# Patient Record
Sex: FEMALE | Race: WHITE | NOT HISPANIC OR LATINO | Employment: FULL TIME
[De-identification: names, ages, dates, MRNs, and addresses within clinical notes are randomized per-mention and may not be internally consistent; named-entity substitution may affect disease eponyms.]

---

## 2018-07-06 ENCOUNTER — RESULTS ONLY (OUTPATIENT)
Dept: LAB | Age: 28
End: 2018-07-06

## 2018-07-06 ENCOUNTER — APPOINTMENT (OUTPATIENT)
Dept: LAB | Facility: OTHER | Age: 28
End: 2018-07-06
Attending: FAMILY MEDICINE
Payer: COMMERCIAL

## 2018-07-06 LAB
APPEARANCE CSF: CLEAR
COLOR CSF: COLORLESS
GLUCOSE CSF-MCNC: 77 MG/DL (ref 40–80)
LYMPH ABN NFR CSF MANUAL: 94 %
MONOS+MACROS NFR CSF MANUAL: 6 %
PROT CSF-MCNC: 78 MG/DL (ref 15–45)
RBC # CSF MANUAL: 7 /UL (ref 0–2)
TUBE # CSF: 3 #
WBC # CSF MANUAL: 494 /UL (ref 0–5)

## 2019-09-26 ENCOUNTER — OFFICE VISIT (OUTPATIENT)
Dept: URGENT CARE | Facility: URGENT CARE | Age: 29
End: 2019-09-26
Payer: COMMERCIAL

## 2019-09-26 VITALS
DIASTOLIC BLOOD PRESSURE: 76 MMHG | OXYGEN SATURATION: 98 % | WEIGHT: 193.2 LBS | SYSTOLIC BLOOD PRESSURE: 110 MMHG | HEART RATE: 84 BPM | TEMPERATURE: 98.3 F

## 2019-09-26 DIAGNOSIS — R51.9 ACUTE INTRACTABLE HEADACHE, UNSPECIFIED HEADACHE TYPE: Primary | ICD-10-CM

## 2019-09-26 DIAGNOSIS — M26.609 TMJ (TEMPOROMANDIBULAR JOINT SYNDROME): ICD-10-CM

## 2019-09-26 PROBLEM — M79.10 MYALGIA: Status: ACTIVE | Noted: 2018-07-06

## 2019-09-26 PROBLEM — B00.3 MENINGITIS DUE TO HERPES SIMPLEX VIRUS: Status: ACTIVE | Noted: 2018-07-08

## 2019-09-26 LAB
ALBUMIN SERPL-MCNC: 4.1 G/DL (ref 3.4–5)
ALP SERPL-CCNC: 62 U/L (ref 40–150)
ALT SERPL W P-5'-P-CCNC: 18 U/L (ref 0–50)
ANION GAP SERPL CALCULATED.3IONS-SCNC: 8 MMOL/L (ref 3–14)
AST SERPL W P-5'-P-CCNC: 12 U/L (ref 0–45)
BASOPHILS # BLD AUTO: 0 10E9/L (ref 0–0.2)
BASOPHILS NFR BLD AUTO: 0.3 %
BILIRUB SERPL-MCNC: 0.3 MG/DL (ref 0.2–1.3)
BUN SERPL-MCNC: 13 MG/DL (ref 7–30)
CALCIUM SERPL-MCNC: 9.1 MG/DL (ref 8.5–10.1)
CHLORIDE SERPL-SCNC: 106 MMOL/L (ref 94–109)
CO2 SERPL-SCNC: 28 MMOL/L (ref 20–32)
CREAT SERPL-MCNC: 0.73 MG/DL (ref 0.52–1.04)
DIFFERENTIAL METHOD BLD: NORMAL
EOSINOPHIL # BLD AUTO: 0.1 10E9/L (ref 0–0.7)
EOSINOPHIL NFR BLD AUTO: 0.8 %
ERYTHROCYTE [DISTWIDTH] IN BLOOD BY AUTOMATED COUNT: 12.6 % (ref 10–15)
GFR SERPL CREATININE-BSD FRML MDRD: >90 ML/MIN/{1.73_M2}
GLUCOSE SERPL-MCNC: 84 MG/DL (ref 70–99)
HCT VFR BLD AUTO: 44.3 % (ref 35–47)
HGB BLD-MCNC: 14.4 G/DL (ref 11.7–15.7)
LYMPHOCYTES # BLD AUTO: 1.8 10E9/L (ref 0.8–5.3)
LYMPHOCYTES NFR BLD AUTO: 25.7 %
MCH RBC QN AUTO: 32.1 PG (ref 26.5–33)
MCHC RBC AUTO-ENTMCNC: 32.5 G/DL (ref 31.5–36.5)
MCV RBC AUTO: 99 FL (ref 78–100)
MONOCYTES # BLD AUTO: 0.6 10E9/L (ref 0–1.3)
MONOCYTES NFR BLD AUTO: 8.6 %
NEUTROPHILS # BLD AUTO: 4.6 10E9/L (ref 1.6–8.3)
NEUTROPHILS NFR BLD AUTO: 64.6 %
PLATELET # BLD AUTO: 252 10E9/L (ref 150–450)
POTASSIUM SERPL-SCNC: 3.9 MMOL/L (ref 3.4–5.3)
PROT SERPL-MCNC: 7.8 G/DL (ref 6.8–8.8)
RBC # BLD AUTO: 4.49 10E12/L (ref 3.8–5.2)
SODIUM SERPL-SCNC: 142 MMOL/L (ref 133–144)
WBC # BLD AUTO: 7.2 10E9/L (ref 4–11)

## 2019-09-26 PROCEDURE — 96372 THER/PROPH/DIAG INJ SC/IM: CPT | Performed by: NURSE PRACTITIONER

## 2019-09-26 PROCEDURE — 36415 COLL VENOUS BLD VENIPUNCTURE: CPT | Performed by: NURSE PRACTITIONER

## 2019-09-26 PROCEDURE — 99204 OFFICE O/P NEW MOD 45 MIN: CPT | Mod: 25 | Performed by: NURSE PRACTITIONER

## 2019-09-26 PROCEDURE — 85025 COMPLETE CBC W/AUTO DIFF WBC: CPT | Performed by: NURSE PRACTITIONER

## 2019-09-26 PROCEDURE — 80053 COMPREHEN METABOLIC PANEL: CPT | Performed by: NURSE PRACTITIONER

## 2019-09-26 RX ORDER — HYDROXYZINE HYDROCHLORIDE 10 MG/1
20 TABLET, FILM COATED ORAL 4 TIMES DAILY
COMMUNITY
Start: 2018-12-11

## 2019-09-26 RX ORDER — LEVONORGESTREL/ETHIN.ESTRADIOL 0.1-0.02MG
1 TABLET ORAL DAILY
COMMUNITY
Start: 2018-10-10

## 2019-09-26 RX ORDER — CYCLOBENZAPRINE HCL 10 MG
10 TABLET ORAL AT BEDTIME
Qty: 30 TABLET | Refills: 0 | Status: SHIPPED | OUTPATIENT
Start: 2019-09-26

## 2019-09-26 RX ORDER — IBUPROFEN 200 MG
200-400 TABLET ORAL EVERY 4 HOURS PRN
COMMUNITY

## 2019-09-26 RX ORDER — ALBUTEROL SULFATE 90 UG/1
AEROSOL, METERED RESPIRATORY (INHALATION)
COMMUNITY
Start: 2018-02-02

## 2019-09-26 RX ORDER — KETOROLAC TROMETHAMINE 30 MG/ML
60 INJECTION, SOLUTION INTRAMUSCULAR; INTRAVENOUS ONCE
Status: COMPLETED | OUTPATIENT
Start: 2019-09-26 | End: 2019-09-26

## 2019-09-26 RX ADMIN — KETOROLAC TROMETHAMINE 60 MG: 30 INJECTION, SOLUTION INTRAMUSCULAR; INTRAVENOUS at 14:58

## 2019-09-26 ASSESSMENT — ENCOUNTER SYMPTOMS
CONFUSION: 0
WEAKNESS: 0
MYALGIAS: 0
FEVER: 0
LIGHT-HEADEDNESS: 1
PALPITATIONS: 0
DYSURIA: 0
COUGH: 0
SPEECH DIFFICULTY: 0
PHOTOPHOBIA: 1
NAUSEA: 1
NECK PAIN: 0
SHORTNESS OF BREATH: 0
DIZZINESS: 1
HEADACHES: 1
VOMITING: 0
EYE PAIN: 1
SORE THROAT: 0

## 2019-09-26 NOTE — NURSING NOTE
The following medication was given:     MEDICATION: Toradol 60 mg  ROUTE: IM  SITE: Ventrogluteal - Right  DOSE: 60 mg   LOT #: [id number]  :  [employer]  EXPIRATION DATE:  03/2021  NDC#: 30572-945-66      Emily Bello

## 2019-09-26 NOTE — PROGRESS NOTES
SUBJECTIVE:   Betzaida Song is a 29 year old female presenting with a chief complaint of   Chief Complaint   Patient presents with     Headache     Patient complains of headache x 2 weeks now        She is a new patient of [facility].    Headache    Onset of symptoms was 2 week(s).  Course of illness is worsening  Severity moderate rates pain 9/10  Character of pain:throbbing and pulsating   Current and associated symptoms: nausea, noise sensitivity and light sensitivity  Location of pain: occipital and frontal  Prodromal sx?:  No  History of Migranes: Yes   Did get meningitis and started getting headaches since then.   Is headache similar to previous: Yes - similar to meningitis   Predisposing factors: None  Treatment and measures tried: Excedrin (hasn't taken for a week)    Has some tingling in hands, checking for lupus. Worse if lays down  Does have some stress in her life, is a single mother of a 4 year old and does have 3 jobs. Does do a lot of computer work and so that could also have some effect.       Review of Systems   Constitutional: Negative for fever.   HENT: Positive for ear pain (left). Negative for congestion and sore throat.    Eyes: Positive for photophobia and pain. Negative for visual disturbance.   Respiratory: Negative for cough and shortness of breath.    Cardiovascular: Negative for chest pain and palpitations.   Gastrointestinal: Positive for nausea. Negative for vomiting.   Genitourinary: Negative for dysuria.   Musculoskeletal: Negative for myalgias and neck pain.   Skin: Negative for rash.   Allergic/Immunologic: Negative for environmental allergies.   Neurological: Positive for dizziness, light-headedness and headaches. Negative for speech difficulty and weakness.   Psychiatric/Behavioral: Negative for confusion.     Surgical history wisdom teeth, and nasal surgery post trauma.   Denies family history of migraines, diabetes, thyroid.   Mom and grandma both have pacemaker  Past Medical  History:   Diagnosis Date     Migraines      History reviewed. No pertinent family history.  Current Outpatient Medications   Medication Sig Dispense Refill     albuterol (VENTOLIN HFA) 108 (90 Base) MCG/ACT inhaler        cyclobenzaprine (FLEXERIL) 10 MG tablet Take 1 tablet (10 mg) by mouth At Bedtime 30 tablet 0     hydrOXYzine (ATARAX) 10 MG tablet Take 20 mg by mouth 4 times daily       levonorgestrel-ethinyl estradiol (AVIANE/ALESSE/LESSINA) 0.1-20 MG-MCG tablet Take 1 tablet by mouth daily       ibuprofen (ADVIL/MOTRIN) 200 MG tablet Take 200-400 mg by mouth every 4 hours as needed       Social History     Tobacco Use     Smoking status: Never Smoker     Smokeless tobacco: Never Used   Substance Use Topics     Alcohol use: Not Currently       OBJECTIVE  /76 (BP Location: Left arm, Patient Position: Chair, Cuff Size: Adult Regular)   Pulse 84   Temp 98.3  F (36.8  C) (Oral)   Wt 87.6 kg (193 lb 3.2 oz)   SpO2 98%     Physical Exam  Vitals signs and nursing note reviewed.   Constitutional:       Appearance: Normal appearance. She is well-developed.   HENT:      Head: Normocephalic and atraumatic.      Right Ear: Tympanic membrane, ear canal and external ear normal.      Left Ear: Ear canal and external ear normal. A middle ear effusion is present.      Nose: No congestion or rhinorrhea.      Right Sinus: Maxillary sinus tenderness present. No frontal sinus tenderness.      Left Sinus: Maxillary sinus tenderness present. No frontal sinus tenderness.      Mouth/Throat:      Mouth: Mucous membranes are moist.      Pharynx: Oropharynx is clear.      Comments: Does have some left sided TMJ tenderness.   Eyes:      Extraocular Movements: Extraocular movements intact.      Conjunctiva/sclera: Conjunctivae normal.      Pupils: Pupils are equal, round, and reactive to light.   Neck:      Musculoskeletal: Normal range of motion and neck supple. No spinous process tenderness or muscular tenderness.    Cardiovascular:      Rate and Rhythm: Normal rate and regular rhythm.      Heart sounds: Normal heart sounds.   Pulmonary:      Effort: Pulmonary effort is normal.      Breath sounds: Normal breath sounds and air entry.   Musculoskeletal:      Cervical back: She exhibits tenderness. She exhibits normal range of motion.   Lymphadenopathy:      Head:      Right side of head: No submandibular or tonsillar adenopathy.      Left side of head: No submandibular or tonsillar adenopathy.      Cervical: No cervical adenopathy.   Skin:     General: Skin is warm and dry.      Capillary Refill: Capillary refill takes less than 2 seconds.   Neurological:      General: No focal deficit present.      Mental Status: She is alert and oriented to person, place, and time.      Cranial Nerves: Cranial nerves are intact.      Sensory: Sensation is intact.      Coordination: Coordination is intact.      Gait: Gait is intact.   Psychiatric:         Attention and Perception: Attention normal.         Mood and Affect: Mood normal.         Speech: Speech normal.         Behavior: Behavior is cooperative.         Thought Content: Thought content normal.         Labs:  Results for orders placed or performed in visit on 09/26/19 (from the past 24 hour(s))   CBC with platelets and differential   Result Value Ref Range    WBC 7.2 4.0 - 11.0 10e9/L    RBC Count 4.49 3.8 - 5.2 10e12/L    Hemoglobin 14.4 11.7 - 15.7 g/dL    Hematocrit 44.3 35.0 - 47.0 %    MCV 99 78 - 100 fl    MCH 32.1 26.5 - 33.0 pg    MCHC 32.5 31.5 - 36.5 g/dL    RDW 12.6 10.0 - 15.0 %    Platelet Count 252 150 - 450 10e9/L    % Neutrophils 64.6 %    % Lymphocytes 25.7 %    % Monocytes 8.6 %    % Eosinophils 0.8 %    % Basophils 0.3 %    Absolute Neutrophil 4.6 1.6 - 8.3 10e9/L    Absolute Lymphocytes 1.8 0.8 - 5.3 10e9/L    Absolute Monocytes 0.6 0.0 - 1.3 10e9/L    Absolute Eosinophils 0.1 0.0 - 0.7 10e9/L    Absolute Basophils 0.0 0.0 - 0.2 10e9/L    Diff Method Automated  Method        X-Ray was not done.    ASSESSMENT:      ICD-10-CM    1. Acute intractable headache, unspecified headache type R51 CBC with platelets and differential     Comprehensive metabolic panel (BMP + Alb, Alk Phos, ALT, AST, Total. Bili, TP)     ketorolac (TORADOL) injection 60 mg   2. TMJ (temporomandibular joint syndrome) M26.609 cyclobenzaprine (FLEXERIL) 10 MG tablet        Medical Decision Making:    Differential Diagnosis:  Headache:  Tension headache, Common migraine, Sinusitis, otitis media, TMJ.    Serious Comorbid Conditions:  Adult:  None    PLAN:  Discussed with patient that CBC is unremarkable. CMP is pending. toradol injection did help with headache. Favor a migraine along with TMJ. Will do cyclobenzaprine daily at bedtime, have her take it for about a week and then as needed. Additional symptomatic treatment recommendations discussed. 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 Education was added to AVS. Patient was agreeable to plan and verbalized understanding.       Followup:    If not improving in 2-3 days or if condition worsens, follow up with your Primary Care Provider    Patient Instructions   Tylenol and ibuprofen to help with pain  Cyclobenzaprine daily at bedtime  Push fluids  Plenty of rest      Patient Education     Self-Care for Headaches  Most headaches aren't serious and can be relieved with self-care. But some headaches may be a sign of another health problem like eye trouble or high blood pressure. To find the best treatment, learn what kind of headaches you get. For tension headaches, self-care will usually help. To treat migraines, ask your healthcare provider for advice. It is also possible to get both tension and migraine headaches. Self-care involves relieving the pain and avoiding headache  triggers  if you can.    Ways to reduce pain and tension  Try these  "steps:    Apply a cold compress or ice pack to the pain site.    Drink fluids. If nausea makes it hard to drink, try sucking on ice.    Rest. Protect yourself from bright light and loud noises.    Calm your emotions by imagining a peaceful scene.    Massage tight neck, shoulder, and head muscles.    To relax muscles, soak in a hot bath or use a hot shower.  Use medicines  Aspirin or other over-the-counter pain medicines, such as ibuprofen and acetaminophen, can relieve headache. Remember: Never give aspirin to anyone 18 years old or younger because of the risk of developing Reye syndrome. Use pain medicines only when needed. Certain prescription medicines, if taken too often, can lead to rebound headaches. Check with your healthcare provider or pharmacist about your medicines.  Track your headaches  Keeping a headache diary can help you and your healthcare provider identify what's causing your headaches:    Note when each headache happens.    Identify your activities and the foods you've eaten 6 to 8 hours before the headache began.    Look for any trends or \"triggers.\"  Signs of tension headache  Any of the following can be signs:    Dull pain or feeling of pressure in a tight band around your head    Pain in your neck or shoulders    Headache without a definite beginning or end    Headache after an activity such as driving or working on a computer  Signs of migraine  Any of the following can be signs:    Throbbing pain on one or both sides of your head    Nausea or vomiting    Extreme sensitivity to light, sound, and smells    Bright spots, flashes, or other visual changes    Pain or nausea so severe that you can't continue your daily activities  Call your healthcare provider   If you have any of the following symptoms, contact your healthcare provider:    A headache that lingers after a recent injury or bump to the head.    A fever with a stiff neck or pain when you bend your head toward your chest.    A headache " "along with slurred speech, changes in your vision, or numbness or weakness in your arms or legs.    A headache for longer than 3 days.    Frequent headaches, especially in the morning.    Headaches with seizures     Seek immediate medical attention if you have a headache that you would call \"the worst headache you have ever had.\"  Date Last Reviewed: 3/1/2018    9009-6010 [employer]. [address] [place] [address], [place] [address]. All rights reserved. This information is not intended as a substitute for professional medical care. Always follow your healthcare professional's instructions.           Patient Education     Self-Care for Temporomandibular Disorders (TMD)    You have temporomandibular disorder (TMD). This term describes a group of problems related to the temporomandibular joint (TMJ) and nearby muscles. The TMJ is located where the upper and lower jaws meet. Treatment will get your jaw back to normal function. But your care doesn t end there. Once you ve had TMD, it s important to avoid reinjury. Get in the habit of doing self-checks. This can make you aware of any symptoms that begin to return, so you can take action right away.  Doing self-checks  Make it a habit to assess your body a few times each day. Try writing yourself a reminder. Or set an alarm on your watch or computer. When doing a self-check, ask yourself:    Do I feel stressed?    Are my muscles tense?    Am I grinding or clenching my teeth?    Is my posture healthy for my body?    Is there anything I can do to make myself more comfortable?  If you answer yes to any of the questions above, you need to take action. Changing your posture or taking a short break can help prevent or relieve TMD symptoms.  Listening to your body  Many people get used to ignoring pain. But pain is a signal that your body needs care. To maintain your TMJ health:    Don t eat hard or chewy foods. Even if you feel fine, eating such foods can trigger " symptoms again.    Be aware of your body. Don t ignore TMD symptoms. The nagging pain in your neck or jaw may be a sign that you need care.    Keep follow-up appointments. Be sure to keep all appointments with your healthcare team.                                                                                                                                Managing stress  Stress is a key factor in TMD. Stress can make you clench your muscles or grind your teeth. It can also affect your sleep, reducing your body s ability to heal. Here are a few tips to manage stress:    Learn ways to relax. Try listening to music or gently stretching. Take a few slow deep breaths. Or, close your eyes and imagine a place or object that is calming.    Get plenty of rest and sleep.    Set goals you know you can attain.    Make time for people and things you enjoy.    Ask for help if you need it. Friends and family can run errands and cook meals for you.   Staying active  Activity helps the body in many ways. You stay looser and more relaxed. It also helps keep muscles and tissues conditioned. That way you can heal faster and make reinjury less likely. Here are some tips to get you started:    Talk with your healthcare provider before starting an exercise program.    Always warm up and stretch before each activity. This helps prevent injury.    Try walking or swimming. These activities are easy on your joints. They also benefit your heart and lungs.    Try yoga or ivan chi. These are relaxing activities known for reducing stress.  Date Last Reviewed: 8/1/2017 2000-2018 The "[employer] [address] [facility], [place] [address]. All rights reserved. This information is not intended as a substitute for professional medical care. Always follow your healthcare professional's instructions.

## 2019-09-26 NOTE — LETTER
September 27, 2019      Betzaida Song  [address]    [place] [address]        Dear ,    We are writing to inform you of your test results.    Your results showed normal kidney, liver, gallbladder function. Normal electrolytes such as sodium and potassium. Normal blood sugar.     Resulted Orders   CBC with platelets and differential   Result Value Ref Range    WBC 7.2 4.0 - 11.0 10e9/L    RBC Count 4.49 3.8 - 5.2 10e12/L    Hemoglobin 14.4 11.7 - 15.7 g/dL    Hematocrit 44.3 35.0 - 47.0 %    MCV 99 78 - 100 fl    MCH 32.1 26.5 - 33.0 pg    MCHC 32.5 31.5 - 36.5 g/dL    RDW 12.6 10.0 - 15.0 %    Platelet Count 252 150 - 450 10e9/L    % Neutrophils 64.6 %    % Lymphocytes 25.7 %    % Monocytes 8.6 %    % Eosinophils 0.8 %    % Basophils 0.3 %    Absolute Neutrophil 4.6 1.6 - 8.3 10e9/L    Absolute Lymphocytes 1.8 0.8 - 5.3 10e9/L    Absolute Monocytes 0.6 0.0 - 1.3 10e9/L    Absolute Eosinophils 0.1 0.0 - 0.7 10e9/L    Absolute Basophils 0.0 0.0 - 0.2 10e9/L    Diff Method Automated Method    Comprehensive metabolic panel (BMP + Alb, Alk Phos, ALT, AST, Total. Bili, TP)   Result Value Ref Range    Sodium 142 133 - 144 mmol/L    Potassium 3.9 3.4 - 5.3 mmol/L    Chloride 106 94 - 109 mmol/L    Carbon Dioxide 28 20 - 32 mmol/L    Anion Gap 8 3 - 14 mmol/L    Glucose 84 70 - 99 mg/dL    Urea Nitrogen 13 7 - 30 mg/dL    Creatinine 0.73 0.52 - 1.04 mg/dL    GFR Estimate >90 >60 mL/min/[1.73_m2]      Comment:      Non  GFR Calc  Starting 12/18/2018, serum creatinine based estimated GFR (eGFR) will be   calculated using the Chronic Kidney Disease Epidemiology Collaboration   (CKD-EPI) equation.      GFR Estimate If Black >90 >60 mL/min/[1.73_m2]      Comment:       GFR Calc  Starting 12/18/2018, serum creatinine based estimated GFR (eGFR) will be   calculated using the Chronic Kidney Disease Epidemiology Collaboration   (CKD-EPI) equation.      Calcium 9.1 8.5 - 10.1  mg/dL    Bilirubin Total 0.3 0.2 - 1.3 mg/dL    Albumin 4.1 3.4 - 5.0 g/dL    Protein Total 7.8 6.8 - 8.8 g/dL    Alkaline Phosphatase 62 40 - 150 U/L    ALT 18 0 - 50 U/L    AST 12 0 - 45 U/L       If you have any questions or concerns, please call the clinic at the number listed above.       Sincerely,      Lonnie Kim, CNP

## 2020-12-17 NOTE — PATIENT INSTRUCTIONS
"Tylenol and ibuprofen to help with pain  Cyclobenzaprine daily at bedtime  Push fluids  Plenty of rest      Patient Education     Self-Care for Headaches  Most headaches aren't serious and can be relieved with self-care. But some headaches may be a sign of another health problem like eye trouble or high blood pressure. To find the best treatment, learn what kind of headaches you get. For tension headaches, self-care will usually help. To treat migraines, ask your healthcare provider for advice. It is also possible to get both tension and migraine headaches. Self-care involves relieving the pain and avoiding headache  triggers  if you can.    Ways to reduce pain and tension  Try these steps:    Apply a cold compress or ice pack to the pain site.    Drink fluids. If nausea makes it hard to drink, try sucking on ice.    Rest. Protect yourself from bright light and loud noises.    Calm your emotions by imagining a peaceful scene.    Massage tight neck, shoulder, and head muscles.    To relax muscles, soak in a hot bath or use a hot shower.  Use medicines  Aspirin or other over-the-counter pain medicines, such as ibuprofen and acetaminophen, can relieve headache. Remember: Never give aspirin to anyone 18 years old or younger because of the risk of developing Reye syndrome. Use pain medicines only when needed. Certain prescription medicines, if taken too often, can lead to rebound headaches. Check with your healthcare provider or pharmacist about your medicines.  Track your headaches  Keeping a headache diary can help you and your healthcare provider identify what's causing your headaches:    Note when each headache happens.    Identify your activities and the foods you've eaten 6 to 8 hours before the headache began.    Look for any trends or \"triggers.\"  Signs of tension headache  Any of the following can be signs:    Dull pain or feeling of pressure in a tight band around your head    Pain in your neck or " "shoulders    Headache without a definite beginning or end    Headache after an activity such as driving or working on a computer  Signs of migraine  Any of the following can be signs:    Throbbing pain on one or both sides of your head    Nausea or vomiting    Extreme sensitivity to light, sound, and smells    Bright spots, flashes, or other visual changes    Pain or nausea so severe that you can't continue your daily activities  Call your healthcare provider   If you have any of the following symptoms, contact your healthcare provider:    A headache that lingers after a recent injury or bump to the head.    A fever with a stiff neck or pain when you bend your head toward your chest.    A headache along with slurred speech, changes in your vision, or numbness or weakness in your arms or legs.    A headache for longer than 3 days.    Frequent headaches, especially in the morning.    Headaches with seizures     Seek immediate medical attention if you have a headache that you would call \"the worst headache you have ever had.\"  Date Last Reviewed: 3/1/2018    8374-3198 [employer]. [address] [place] [address]. All rights reserved. This information is not intended as a substitute for professional medical care. Always follow your healthcare professional's instructions.           Patient Education     Self-Care for Temporomandibular Disorders (TMD)    You have temporomandibular disorder (TMD). This term describes a group of problems related to the temporomandibular joint (TMJ) and nearby muscles. The TMJ is located where the upper and lower jaws meet. Treatment will get your jaw back to normal function. But your care doesn t end there. Once you ve had TMD, it s important to avoid reinjury. Get in the habit of doing self-checks. This can make you aware of any symptoms that begin to return, so you can take action right away.  Doing self-checks  Make it a habit to assess your body a few times each " day. Try writing yourself a reminder. Or set an alarm on your watch or computer. When doing a self-check, ask yourself:    Do I feel stressed?    Are my muscles tense?    Am I grinding or clenching my teeth?    Is my posture healthy for my body?    Is there anything I can do to make myself more comfortable?  If you answer yes to any of the questions above, you need to take action. Changing your posture or taking a short break can help prevent or relieve TMD symptoms.  Listening to your body  Many people get used to ignoring pain. But pain is a signal that your body needs care. To maintain your TMJ health:    Don t eat hard or chewy foods. Even if you feel fine, eating such foods can trigger symptoms again.    Be aware of your body. Don t ignore TMD symptoms. The nagging pain in your neck or jaw may be a sign that you need care.    Keep follow-up appointments. Be sure to keep all appointments with your healthcare team.                                                                                                                                Managing stress  Stress is a key factor in TMD. Stress can make you clench your muscles or grind your teeth. It can also affect your sleep, reducing your body s ability to heal. Here are a few tips to manage stress:    Learn ways to relax. Try listening to music or gently stretching. Take a few slow deep breaths. Or, close your eyes and imagine a place or object that is calming.    Get plenty of rest and sleep.    Set goals you know you can attain.    Make time for people and things you enjoy.    Ask for help if you need it. Friends and family can run errands and cook meals for you.   Staying active  Activity helps the body in many ways. You stay looser and more relaxed. It also helps keep muscles and tissues conditioned. That way you can heal faster and make reinjury less likely. Here are some tips to get you started:    Talk with your healthcare provider before starting an  exercise program.    Always warm up and stretch before each activity. This helps prevent injury.    Try walking or swimming. These activities are easy on your joints. They also benefit your heart and lungs.    Try yoga or ivan chi. These are relaxing activities known for reducing stress.  Date Last Reviewed: 8/1/2017 2000-2018 [employer]. [address] [place] [address], [place] [address]. All rights reserved. This information is not intended as a substitute for professional medical care. Always follow your healthcare professional's instructions.            Yes

## 2023-01-15 ENCOUNTER — OFFICE VISIT (OUTPATIENT)
Dept: URGENT CARE | Facility: URGENT CARE | Age: 33
End: 2023-01-15
Payer: COMMERCIAL

## 2023-01-15 ENCOUNTER — ANCILLARY PROCEDURE (OUTPATIENT)
Dept: GENERAL RADIOLOGY | Facility: CLINIC | Age: 33
End: 2023-01-15
Attending: EMERGENCY MEDICINE
Payer: COMMERCIAL

## 2023-01-15 VITALS
TEMPERATURE: 97.4 F | HEART RATE: 87 BPM | DIASTOLIC BLOOD PRESSURE: 67 MMHG | OXYGEN SATURATION: 100 % | SYSTOLIC BLOOD PRESSURE: 107 MMHG | WEIGHT: 215 LBS

## 2023-01-15 DIAGNOSIS — S99.911A ANKLE INJURY, RIGHT, INITIAL ENCOUNTER: Primary | ICD-10-CM

## 2023-01-15 DIAGNOSIS — S96.911A SPRAIN AND STRAIN OF RIGHT ANKLE: ICD-10-CM

## 2023-01-15 DIAGNOSIS — T14.90XA TRAUMA: ICD-10-CM

## 2023-01-15 DIAGNOSIS — S93.401A SPRAIN AND STRAIN OF RIGHT ANKLE: ICD-10-CM

## 2023-01-15 PROCEDURE — 73610 X-RAY EXAM OF ANKLE: CPT | Mod: TC | Performed by: RADIOLOGY

## 2023-01-15 PROCEDURE — 99203 OFFICE O/P NEW LOW 30 MIN: CPT | Performed by: EMERGENCY MEDICINE

## 2023-01-15 RX ORDER — HYDROCODONE BITARTRATE AND ACETAMINOPHEN 5; 325 MG/1; MG/1
1 TABLET ORAL EVERY 6 HOURS PRN
Qty: 18 TABLET | Refills: 0 | Status: SHIPPED | OUTPATIENT
Start: 2023-01-15 | End: 2023-01-18

## 2023-01-15 NOTE — LETTER
January 15, 2023      Betzaida Melton  [address] [facility] [address]        To Whom It May Concern:    Betzaida Melton was seen in our clinic. She may return to work on 1/18/2023 with the following: Seated work only for 1 week.    Sincerely,        Nicanor Bueno MD

## 2023-01-15 NOTE — PROGRESS NOTES
CHIEF COMPLAINT: Right ankle injury      HPI: Patient is a 32-year-old female who stumbled and tripped shortly for arrival this morning injuring her right ankle.  She complains of pain over the lateral aspect the ankle.  She had immediate pain with weightbearing.  She since no injury to the knee lower leg or foot.  No numbness or tingling distally.  Other than some minor scuffing on her hands she notes no other injuries.      ROS: See HPI otherwise normal.    Allergies   Allergen Reactions     Blood-Group Specific Substance Unknown     Other reaction(s): *Unknown  Patient has Probable Passive Anti-D.  Blood Product orders may be delayed.  Draw one red top and two purple top tubes for ALL Type and Screen/ Type and Crossmatch orders.  Patient has Probable Passive Anti-D.  Blood Product orders may be delayed.  Draw one red top and two purple top tubes for ALL Type and Screen/ Type and Crossmatch orders.       Metoclopramide Other (See Comments)     Became combative      Current Outpatient Medications   Medication Sig Dispense Refill     albuterol (PROAIR HFA/PROVENTIL HFA/VENTOLIN HFA) 108 (90 Base) MCG/ACT inhaler  (Patient not taking: Reported on 1/15/2023)       cyclobenzaprine (FLEXERIL) 10 MG tablet Take 1 tablet (10 mg) by mouth At Bedtime (Patient not taking: Reported on 1/15/2023) 30 tablet 0     hydrOXYzine (ATARAX) 10 MG tablet Take 20 mg by mouth 4 times daily (Patient not taking: Reported on 1/15/2023)       ibuprofen (ADVIL/MOTRIN) 200 MG tablet Take 200-400 mg by mouth every 4 hours as needed (Patient not taking: Reported on 1/15/2023)       levonorgestrel-ethinyl estradiol (AVIANE/ALESSE/LESSINA) 0.1-20 MG-MCG tablet Take 1 tablet by mouth daily (Patient not taking: Reported on 1/15/2023)           PE: Physical exam reveals a 32-year-old female to be no acute distress.  Vital signs were reviewed and are normal-see chart.  Examination her ankle reveals moderate swelling and localized tenderness of the  distal fibular region.  She does have very slight medial joint line tenderness over the deltoid ligament.  Lower leg up to knee nontender foot nontender overlying skin no wounds.  Sensorimotor exam are intact distally.        TREATMENT: X-ray right ankle: Negative per radiologist.  CAM Walker boot fitted.    ASSESSMENT: Severe sprain right ankle requiring orthopedic follow-up.  Really      DIAGNOSIS: Severe sprain right ankle      PLAN: Ice pack plus elevation intermittently for next 48 hours.  Wear boot at all times when up and in bed.  Orthopedic follow-up 1 week

## 2023-01-15 NOTE — PATIENT INSTRUCTIONS
Ice pack plus elevation 48 hours  Follow-up with orthopedic clinic in tomorrow to arrange follow-up in 1 week.  Vicodin will cause drowsiness.  May take Tylenol and/or ibuprofen as well.

## 2023-01-20 ENCOUNTER — TELEPHONE (OUTPATIENT)
Dept: FAMILY MEDICINE | Facility: CLINIC | Age: 33
End: 2023-01-20
Payer: COMMERCIAL

## 2023-01-20 NOTE — PROGRESS NOTES
ASSESSMENT & PLAN    Betzaida was seen today for pain.    Diagnoses and all orders for this visit:    Sprain of other ligament of right ankle, initial encounter  -     Orthopedic  Referral      This issue is acute and Improving.    Discussed the likely injuries associated with a lateral ankle sprain and typical treatment including rest from irritating activities and pain free weight bearing - walking boot transitioning to brace.  Patient will need a gradual rehabilitation program focusing on range of motion, strengthening and proprioception - will start with Home Exercise Program.  Discussed a gradual return to sports and activities once pain free, no swelling, full range of motion and full strength.  Follow up at this time is only as needed - in ~3 weeks if needed.  Recommended ankle brace use with activities for at least 6 months post injury.     Discussed possible lateral distal fibular avulsion would be treated symptomatically like a sprain.    Plan:  - Today's Plan of Care:  Can gradually transition from walking boot to brace as symptoms allow  Start Home Exercise Program  Discussed activity considerations and other supportive care including Ice/Heat, OTC and other topical medications as needed.    -We also discussed other future treatment options:  Referral to Physical Therapy  Other imaging - x-ray or MRI if symptoms worsen  Call if needing a note    Follow Up: 3-4 weeks if needed    Concerning signs and symptoms were reviewed.  The patient expressed understanding of this management plan and all questions were answered at this time.    Lia Rosado MD [facility]  Sports Medicine Physician  [facility]      -----  Chief Complaint   Patient presents with     Right Ankle - Pain       SUBJECTIVE  Betzaida Melton is a/an 32 year old female who is seen as an  referral for evaluation of right ankle pain.     The patient is seen by themselves.    Onset: 8 day(s) ago, 1/15/2023. Patient  describes injury as she tripped and fell over some shoes, inversion injury  Location of Pain: lateral ankle pain, lateral malleolus   Worsened by: worse at the end of the day  Better with: NWB, boot  Treatments tried: rest/activity avoidance, elevation, ice, Tylenol, ibuprofen and knee scooter  Associated symptoms: weakness of right ankle and feeling of instability    Orthopedic/Surgical history: Right ankle sprain after christmas 2022  Social History/Occupation: works retail     No family history pertinent to patient's problem today.     REVIEW OF SYSTEMS:  Review of Systems  Skin: no bruising, mild swelling  Musculoskeletal: as above  Neurologic: no numbness, paresthesias  Remainder of review of systems is negative including constitutional, CV, pulmonary, GI, except as noted in HPI or medical history.    OBJECTIVE:  /76   Pulse 77   Wt 97.5 kg (215 lb)    General: healthy, alert and in no distress  HEENT: no scleral icterus or conjunctival erythema  Skin: no suspicious lesions or rash. No jaundice.  CV: distal perfusion intact  Resp: normal respiratory effort without conversational dyspnea   Psych: normal mood and affect  Gait: antalgic  Neuro: Normal light sensory exam of lower extremity    Bilateral Foot and Ankle Exam:    Inspection:       no visible ecchymosis       edema noted mild right lateral ankle    Foot inspection:       no deformity noted    Tender:       lateral ankle ligaments  right    Non-Tender:       remainder of ankle and foot right including medial and lateral malleolus, base of the fifth metatarsal, midfoot    ROM:        Slightly limited dorsiflexion and plantarflexion on the right, otherwise, full active and passive ROM, ankle inversion, eversion, great toe dorsiflexion, remainder of toes, midfoot and subtalar bilateral    Strength:       ankle dorsiflexion 5/5 bilateral       plantarflexion 5/5 bilateral       inversion 5/5 bilateral       eversion 5/5 bilateral    Special Tests:        neg (-) anterior drawer right       neg (-) talar tilt right       neg (-) external rotation testing right       neg (-) squeeze test right    Gait:       Slightly antalgic    Neurovascular:       2+ peripheral pulses bilaterally and brisk capillary refill       sensation grossly intact        RADIOLOGY:  I independently visualized and reviewed these images with the patient  3 XR views of right ankle reviewed: no acute bony abnormality, small possible avulsion distal fibula acute v. Chronic, no significant degenerative change  - will follow official read        Review of prior external note(s) from -   Review of the result(s) of each unique test - XR

## 2023-01-20 NOTE — TELEPHONE ENCOUNTER
Reason for Call:  X-ray Results     Detailed comments: 1/15/23 - Pt was into see UC and had X-ray Results - The radiologist review it further and give her a call. Pt said she has not heard from any one this week.    Phone Number Patient can be reached at: Home number on file [phone] (home)    Best Time: Any Time      Can we leave a detailed message on this number? YES    Call taken on 1/20/2023 at 2:15 PM by Marina Luther

## 2023-01-23 ENCOUNTER — OFFICE VISIT (OUTPATIENT)
Dept: ORTHOPEDICS | Facility: CLINIC | Age: 33
End: 2023-01-23
Attending: EMERGENCY MEDICINE
Payer: COMMERCIAL

## 2023-01-23 VITALS — HEART RATE: 77 BPM | WEIGHT: 215 LBS | SYSTOLIC BLOOD PRESSURE: 124 MMHG | DIASTOLIC BLOOD PRESSURE: 76 MMHG

## 2023-01-23 DIAGNOSIS — S93.491A SPRAIN OF OTHER LIGAMENT OF RIGHT ANKLE, INITIAL ENCOUNTER: ICD-10-CM

## 2023-01-23 PROCEDURE — 99203 OFFICE O/P NEW LOW 30 MIN: CPT | Performed by: PEDIATRICS

## 2023-01-23 NOTE — LETTER
1/23/2023         RE: Betzaida Melton  [address]  [place] [address]        Dear Colleague,    Thank you for referring your patient, Betzaida Melton, to the [facility] [place]. Please see a copy of my visit note below.    ASSESSMENT & PLAN    Betzaida was seen today for pain.    Diagnoses and all orders for this visit:    Sprain of other ligament of right ankle, initial encounter  -     Orthopedic  Referral      This issue is acute and Improving.    Discussed the likely injuries associated with a lateral ankle sprain and typical treatment including rest from irritating activities and pain free weight bearing - walking boot transitioning to brace.  Patient will need a gradual rehabilitation program focusing on range of motion, strengthening and proprioception - will start with Home Exercise Program.  Discussed a gradual return to sports and activities once pain free, no swelling, full range of motion and full strength.  Follow up at this time is only as needed - in ~3 weeks if needed.  Recommended ankle brace use with activities for at least 6 months post injury.     Discussed possible lateral distal fibular avulsion would be treated symptomatically like a sprain.    Plan:  - Today's Plan of Care:  Can gradually transition from walking boot to brace as symptoms allow  Start Home Exercise Program  Discussed activity considerations and other supportive care including Ice/Heat, OTC and other topical medications as needed.    -We also discussed other future treatment options:  Referral to Physical Therapy  Other imaging - x-ray or MRI if symptoms worsen  Call if needing a note    Follow Up: 3-4 weeks if needed    Concerning signs and symptoms were reviewed.  The patient expressed understanding of this management plan and all questions were answered at this time.    Lia Rosado MD [facility]  Sports Medicine Physician  [id number]  Orthopedics      -----  Chief Complaint   Patient presents with     Right Ankle - Pain       SUBJECTIVE  Betzaida Juanita Melton is a/an 32 year old female who is seen as an UC referral for evaluation of right ankle pain.     The patient is seen by themselves.    Onset: 8 day(s) ago, 1/15/2023. Patient describes injury as she tripped and fell over some shoes, inversion injury  Location of Pain: lateral ankle pain, lateral malleolus   Worsened by: worse at the end of the day  Better with: NWB, boot  Treatments tried: rest/activity avoidance, elevation, ice, Tylenol, ibuprofen and knee scooter  Associated symptoms: weakness of right ankle and feeling of instability    Orthopedic/Surgical history: Right ankle sprain after christmas 2022  Social History/Occupation: works retail     No family history pertinent to patient's problem today.     REVIEW OF SYSTEMS:  Review of Systems  Skin: no bruising, mild swelling  Musculoskeletal: as above  Neurologic: no numbness, paresthesias  Remainder of review of systems is negative including constitutional, CV, pulmonary, GI, except as noted in HPI or medical history.    OBJECTIVE:  /76   Pulse 77   Wt 97.5 kg (215 lb)    General: healthy, alert and in no distress  HEENT: no scleral icterus or conjunctival erythema  Skin: no suspicious lesions or rash. No jaundice.  CV: distal perfusion intact  Resp: normal respiratory effort without conversational dyspnea   Psych: normal mood and affect  Gait: antalgic  Neuro: Normal light sensory exam of lower extremity    Bilateral Foot and Ankle Exam:    Inspection:       no visible ecchymosis       edema noted mild right lateral ankle    Foot inspection:       no deformity noted    Tender:       lateral ankle ligaments  right    Non-Tender:       remainder of ankle and foot right including medial and lateral malleolus, base of the fifth metatarsal, midfoot    ROM:        Slightly limited dorsiflexion and plantarflexion on the right,  otherwise, full active and passive ROM, ankle inversion, eversion, great toe dorsiflexion, remainder of toes, midfoot and subtalar bilateral    Strength:       ankle dorsiflexion 5/5 bilateral       plantarflexion 5/5 bilateral       inversion 5/5 bilateral       eversion 5/5 bilateral    Special Tests:       neg (-) anterior drawer right       neg (-) talar tilt right       neg (-) external rotation testing right       neg (-) squeeze test right    Gait:       Slightly antalgic    Neurovascular:       2+ peripheral pulses bilaterally and brisk capillary refill       sensation grossly intact        RADIOLOGY:  I independently visualized and reviewed these images with the patient  3 XR views of right ankle reviewed: no acute bony abnormality, small possible avulsion distal fibula acute v. Chronic, no significant degenerative change  - will follow official read        Review of prior external note(s) from -   Review of the result(s) of each unique test - XR             Again, thank you for allowing me to participate in the care of your patient.        Sincerely,        Lia Rosado MD

## 2023-01-23 NOTE — PATIENT INSTRUCTIONS
Discussed the likely injuries associated with a lateral ankle sprain and typical treatment including rest from irritating activities and pain free weight bearing - walking boot transitioning to brace.  Patient will need a gradual rehabilitation program focusing on range of motion, strengthening and proprioception - will start with Home Exercise Program.  Discussed a gradual return to sports and activities once pain free, no swelling, full range of motion and full strength.  Follow up at this time is only as needed - in ~3 weeks if needed.  Recommended ankle brace use with activities for at least 6 months post injury.     Plan:  - Today's Plan of Care:  Can gradually transition from walking boot to brace as symptoms allow  Start Home Exercise Program  Discussed activity considerations and other supportive care including Ice/Heat, OTC and other topical medications as needed.    -We also discussed other future treatment options:  Referral to Physical Therapy  Other imaging - x-ray or MRI if symptoms worsen  Call if needing a note    Follow Up: 3-4 weeks if needed    If you have any further questions for your physician or physician s care team you can call [phone] and use option 3 to leave a voice message.